# Patient Record
Sex: MALE | Race: WHITE | NOT HISPANIC OR LATINO | Employment: OTHER | URBAN - NONMETROPOLITAN AREA
[De-identification: names, ages, dates, MRNs, and addresses within clinical notes are randomized per-mention and may not be internally consistent; named-entity substitution may affect disease eponyms.]

---

## 2023-05-03 ENCOUNTER — OFFICE VISIT (OUTPATIENT)
Dept: UROLOGY | Facility: CLINIC | Age: 68
End: 2023-05-03

## 2023-05-03 VITALS — WEIGHT: 178 LBS | HEIGHT: 71 IN | BODY MASS INDEX: 24.92 KG/M2 | TEMPERATURE: 97.9 F

## 2023-05-03 DIAGNOSIS — N40.1 BENIGN PROSTATIC HYPERPLASIA WITH LOWER URINARY TRACT SYMPTOMS, SYMPTOM DETAILS UNSPECIFIED: Primary | ICD-10-CM

## 2023-05-03 RX ORDER — TAMSULOSIN HYDROCHLORIDE 0.4 MG/1
1 CAPSULE ORAL DAILY
COMMUNITY
Start: 2023-05-01

## 2023-05-03 RX ORDER — MAGNESIUM OXIDE 400 MG/1
400 TABLET ORAL DAILY
COMMUNITY

## 2023-05-03 RX ORDER — ZOLPIDEM TARTRATE 10 MG/1
5 TABLET ORAL
COMMUNITY

## 2023-05-03 RX ORDER — ROSUVASTATIN CALCIUM 10 MG/1
TABLET, COATED ORAL
COMMUNITY

## 2023-05-03 RX ORDER — LOLIUM PERENNE 100000 [BAU]/ML
SOLUTION INTRADERMAL; PERCUTANEOUS; SUBCUTANEOUS
COMMUNITY

## 2023-05-03 RX ORDER — TADALAFIL 5 MG/1
5 TABLET ORAL DAILY PRN
Qty: 90 TABLET | Refills: 3 | Status: SHIPPED | OUTPATIENT
Start: 2023-05-03

## 2023-05-03 RX ORDER — VITAMIN E 268 MG
400 CAPSULE ORAL DAILY
COMMUNITY

## 2023-05-03 RX ORDER — DIAZEPAM 10 MG/1
10 TABLET ORAL
COMMUNITY

## 2023-05-03 RX ORDER — DOXYCYCLINE HYCLATE 100 MG/1
100 CAPSULE ORAL
COMMUNITY

## 2023-05-03 RX ORDER — FAMOTIDINE 40 MG/1
40 TABLET, FILM COATED ORAL
COMMUNITY

## 2023-05-03 RX ORDER — NAPROXEN SODIUM 220 MG
220 TABLET ORAL
COMMUNITY

## 2023-05-03 RX ORDER — LEVOTHYROXINE SODIUM 0.05 MG/1
50 TABLET ORAL
COMMUNITY

## 2023-05-03 RX ORDER — PITAVASTATIN CALCIUM 4.18 MG/1
4 TABLET, FILM COATED ORAL
COMMUNITY

## 2023-05-03 RX ORDER — FINASTERIDE 5 MG/1
5 TABLET, FILM COATED ORAL DAILY
COMMUNITY

## 2023-05-03 RX ORDER — ASPIRIN 81 MG/1
81 TABLET ORAL DAILY
COMMUNITY

## 2023-05-03 RX ORDER — CYCLOBENZAPRINE HCL 10 MG
10 TABLET ORAL
COMMUNITY

## 2023-05-03 NOTE — PATIENT INSTRUCTIONS
Please get your PSA done in 6 months  I will see you back in 1 year  Decrease your Flomax to once daily and discontinue it if you are satisfied with your urination    Call if questions or concerns

## 2023-05-03 NOTE — PROGRESS NOTES
UROLOGY PROGRESS NOTE         NAME: Abdon Dodge  AGE: 79 y o  SEX: male  : 1955   MRN: [id number]    DATE: 5/3/2023  TIME: 3:16 PM    Assessment and Plan      Impression:   1  Benign prostatic hyperplasia with lower urinary tract symptoms, symptom details unspecified       Plan: Patient doing much better following Rezum procedure  It took a while to notice improvement and he had inflammatory issues requiring a short course of steroids during recovery after the procedure which was done back in December  His PSA was up slightly from what it was last year but he did discontinue his finasteride in December after the Rezum procedure  I suspect the PSA rises related to him discontinuing the finasteride  There were no abnormalities on prostate exam today  We will repeat a PSA in 6 months he is going to wean and discontinue his Flomax  We will see him back in 1 year barring any issues  Chief Complaint     Chief Complaint   Patient presents with    pan danielson-leonel Berry at [facility] Benign Prostatic Hypertrophy     History of Present Illness     HPI: Abdon Dodge is a 79y o  year old male who presents with history of BPH  He had a Rezum procedure back in December  He had some postop inflammatory issues which required a short course of steroid  His finasteride was discontinued in December  His PSA did drift up slightly since then on a recent check  Overall he is voiding much better with a good stream   His postvoid residual was less than 30 cc 2 days ago                The following portions of the patient's history were reviewed and updated as appropriate: allergies, current medications, past family history, past medical history, past social history, past surgical history and problem list   Past Medical History:   Diagnosis Date    Benign prostatic hyperplasia      Past Surgical History:   Procedure Laterality Date    BOWEL RESECTION      NECK SURGERY      fusions     shoulder  Review of Systems "    Const: Denies chills, fever and weight loss  CV: Denies chest pain  Resp: Denies SOB  GI: Denies abdominal pain, nausea and vomiting  : Denies symptoms other than stated above  Musculo: Denies back pain  Objective   BP (P) 140/70 (BP Location: Left arm, Patient Position: Sitting, Cuff Size: Adult)   Pulse (P) 78   Temp 97 9 °F (36 6 °C) (Temporal)   Ht 5' 11\" (1 803 m)   Wt 80 7 kg (178 lb)   SpO2 (P) 96%   BMI 24 83 kg/m²     Physical Exam  Const: Appears healthy and well developed  No signs of acute distress present  Resp: Respirations are regular and unlabored  CV: Rate is regular  Rhythm is regular  Abdomen: Abdomen is soft, nontender, and nondistended  Kidneys are not palpable  : The prostate is 2-3+ smooth soft and there is a central divot from the apex to the base consistent with reabsorption of prostate tissue from his procedure  Psych: Patient's attitude is cooperative   Mood is normal  Affect is normal     Procedure   Procedures     Current Medications     Current Outpatient Medications:     diazepam (VALIUM) 10 mg tablet, Take 10 mg by mouth, Disp: , Rfl:     famotidine (PEPCID) 40 MG tablet, Take 40 mg by mouth, Disp: , Rfl:     LACTOBACILLUS PO, Take 2 tablets by mouth daily, Disp: , Rfl:     levothyroxine 50 mcg tablet, Take 50 mcg by mouth, Disp: , Rfl:     magnesium oxide (MAG-OX) 400 mg tablet, Take 400 mg by mouth daily, Disp: , Rfl:     Multiple Vitamins-Minerals (MULTIVITAL PO), Take by mouth, Disp: , Rfl:     Pitavastatin Calcium (Livalo) 4 MG TABS, Take 4 mg by mouth, Disp: , Rfl:     tamsulosin (FLOMAX) 0 4 mg, Take 1 capsule by mouth daily, Disp: , Rfl:     vitamin E, tocopherol, 400 units capsule, Take 400 Units by mouth daily, Disp: , Rfl:     zolpidem (AMBIEN) 10 mg tablet, Take 5 mg by mouth, Disp: , Rfl:     aspirin (ECOTRIN LOW STRENGTH) 81 mg EC tablet, Take 81 mg by mouth daily (Patient not taking: Reported on 5/3/2023), Disp: , Rfl:     " cyclobenzaprine (FLEXERIL) 10 mg tablet, Take 10 mg by mouth (Patient not taking: Reported on 5/3/2023), Disp: , Rfl:     doxycycline hyclate (VIBRAMYCIN) 100 mg capsule, Take 100 mg by mouth (Patient not taking: Reported on 5/3/2023), Disp: , Rfl:     finasteride (PROSCAR) 5 mg tablet, Take 5 mg by mouth daily (Patient not taking: Reported on 5/3/2023), Disp: , Rfl:     naproxen sodium (ALEVE) 220 MG tablet, Take 220 mg by mouth (Patient not taking: Reported on 5/3/2023), Disp: , Rfl:     Perennial [place] Grass Pollen 348510 BAU/ML SOLN, Take by mouth (Patient not taking: Reported on 5/3/2023), Disp: , Rfl:     rosuvastatin (CRESTOR) 10 MG tablet, rosuvastatin 10 mg tablet  Take 1 tablet every day by oral route at bedtime   (Patient not taking: Reported on 5/3/2023), Disp: , Rfl:     tapentadol (NUCYNTA) 75 mg tablet, Take 75 mg by mouth 2 (two) times a day as needed (Patient not taking: Reported on 5/3/2023), Disp: , Rfl:         Fang Russell MD

## 2023-05-04 LAB
SL AMB  POCT GLUCOSE, UA: ABNORMAL
SL AMB LEUKOCYTE ESTERASE,UA: ABNORMAL
SL AMB POCT BILIRUBIN,UA: ABNORMAL
SL AMB POCT BLOOD,UA: ABNORMAL
SL AMB POCT CLARITY,UA: CLEAR
SL AMB POCT COLOR,UA: YELLOW
SL AMB POCT KETONES,UA: ABNORMAL
SL AMB POCT NITRITE,UA: ABNORMAL
SL AMB POCT PH,UA: 5.5
SL AMB POCT SPECIFIC GRAVITY,UA: 1.03
SL AMB POCT URINE PROTEIN: ABNORMAL
SL AMB POCT UROBILINOGEN: 0.2

## 2023-10-16 ENCOUNTER — TELEPHONE (OUTPATIENT)
Dept: UROLOGY | Facility: CLINIC | Age: 68
End: 2023-10-16

## 2023-10-16 NOTE — TELEPHONE ENCOUNTER
Dr. Nancy Harris reviewed PSA from 10/12/23, no further reccs. Continue follow up appt.  PT aware psa normal.

## 2024-05-02 PROBLEM — N40.1 BENIGN PROSTATIC HYPERPLASIA WITH LOWER URINARY TRACT SYMPTOMS: Status: ACTIVE | Noted: 2024-05-02

## 2024-05-03 ENCOUNTER — OFFICE VISIT (OUTPATIENT)
Dept: UROLOGY | Facility: CLINIC | Age: 69
End: 2024-05-03
Payer: COMMERCIAL

## 2024-05-03 VITALS
DIASTOLIC BLOOD PRESSURE: 68 MMHG | RESPIRATION RATE: 16 BRPM | OXYGEN SATURATION: 97 % | HEIGHT: 71 IN | HEART RATE: 63 BPM | WEIGHT: 177.6 LBS | BODY MASS INDEX: 24.86 KG/M2 | SYSTOLIC BLOOD PRESSURE: 122 MMHG | TEMPERATURE: 97.9 F

## 2024-05-03 DIAGNOSIS — N40.1 BENIGN PROSTATIC HYPERPLASIA WITH LOWER URINARY TRACT SYMPTOMS, SYMPTOM DETAILS UNSPECIFIED: Primary | ICD-10-CM

## 2024-05-03 LAB
SL AMB  POCT GLUCOSE, UA: NORMAL
SL AMB LEUKOCYTE ESTERASE,UA: NORMAL
SL AMB POCT BILIRUBIN,UA: NORMAL
SL AMB POCT BLOOD,UA: NORMAL
SL AMB POCT CLARITY,UA: CLEAR
SL AMB POCT COLOR,UA: YELLOW
SL AMB POCT KETONES,UA: NORMAL
SL AMB POCT NITRITE,UA: NORMAL
SL AMB POCT PH,UA: 6
SL AMB POCT SPECIFIC GRAVITY,UA: 1.01
SL AMB POCT URINE PROTEIN: NORMAL
SL AMB POCT UROBILINOGEN: 0.2

## 2024-05-03 PROCEDURE — 81003 URINALYSIS AUTO W/O SCOPE: CPT | Performed by: UROLOGY

## 2024-05-03 PROCEDURE — 99213 OFFICE O/P EST LOW 20 MIN: CPT | Performed by: UROLOGY

## 2024-05-03 RX ORDER — HYDROCODONE BITARTRATE AND ACETAMINOPHEN 5; 325 MG/1; MG/1
1 TABLET ORAL EVERY 6 HOURS PRN
COMMUNITY

## 2024-05-03 NOTE — PROGRESS NOTES
UROLOGY PROGRESS NOTE         NAME: Keaton Baldwin  AGE: 68 y.o. SEX: male  : 1955   MRN: [id number]    DATE: 5/3/2024  TIME: 10:25 AM    Assessment and Plan      Impression:   1. Benign prostatic hyperplasia with lower urinary tract symptoms, symptom details unspecified  -     POCT urine dip auto non-scope         Plan: Patient doing well urologically.  Exam good today.  Urinalysis normal.  No significant voiding issues up once a night.  Continues on tadalafil and finasteride.  PSA normal.  Follow-up 1 year.      Chief Complaint     Chief Complaint   Patient presents with   • Follow-up     Yearly check up. Patient had last PSA done 10/12/23 it was 1.21     History of Present Illness     HPI: Keaton Baldwin is a 68 y.o. year old male who presents with BPH.  Patient on tamsulosin finasteride and tadalafil.  Here for follow-up.  Last PSA 1.21.  2023.  Patient is doing very well.  He is active he exercises keeps his weight under control.  He does note some diminished libido which could potentially be related to his medication.  I offered to check testosterone levels.  He has a significant family history of heart disease and cardiovascular issues.  He is not really interested in supplemental testosterone.  Concerned about risks.  He does take magnesium vitamin B and vitamin D supplements.  No sleep apnea.              The following portions of the patient's history were reviewed and updated as appropriate: allergies, current medications, past family history, past medical history, past social history, past surgical history and problem list.  Past Medical History:   Diagnosis Date   • Benign prostatic hyperplasia      Past Surgical History:   Procedure Laterality Date   • BOWEL RESECTION     • NECK SURGERY      fusions     shoulder  Review of Systems     Const: Denies chills, fever and weight loss.  CV: Denies chest pain.  Resp: Denies SOB.  GI: Denies abdominal pain, nausea and vomiting.  : Denies symptoms  "other than stated above.  Musculo: Denies back pain.    Objective   /68   Pulse 63   Temp 97.9 °F (36.6 °C) (Tympanic)   Resp 16   Ht 5' 11\" (1.803 m)   Wt 80.6 kg (177 lb 9.6 oz)   SpO2 97%   BMI 24.77 kg/m²     Physical Exam  Const: Appears healthy and well developed. No signs of acute distress present.  Resp: Respirations are regular and unlabored.   CV: Rate is regular. Rhythm is regular.  Abdomen: Abdomen is soft, nontender, and nondistended. Kidneys are not palpable.  : Prostate is 2-3+ smooth soft symmetric elastic no nodules.  Psych: Patient's attitude is cooperative. Mood is normal. Affect is normal.    Procedure   Procedures     Current Medications     Current Outpatient Medications:   •  aspirin (ECOTRIN LOW STRENGTH) 81 mg EC tablet, Take 81 mg by mouth daily, Disp: , Rfl:   •  diazepam (VALIUM) 10 mg tablet, Take 10 mg by mouth, Disp: , Rfl:   •  famotidine (PEPCID) 40 MG tablet, Take 40 mg by mouth, Disp: , Rfl:   •  HYDROcodone-acetaminophen (NORCO) 5-325 mg per tablet, Take 1 tablet by mouth every 6 (six) hours as needed for pain, Disp: , Rfl:   •  LACTOBACILLUS PO, Take 2 tablets by mouth daily, Disp: , Rfl:   •  levothyroxine 50 mcg tablet, Take 50 mcg by mouth, Disp: , Rfl:   •  magnesium oxide (MAG-OX) 400 mg tablet, Take 400 mg by mouth daily, Disp: , Rfl:   •  Multiple Vitamins-Minerals (MULTIVITAL PO), Take by mouth, Disp: , Rfl:   •  naproxen sodium (ALEVE) 220 MG tablet, Take 220 mg by mouth every 12 (twelve) hours as needed, Disp: , Rfl:   •  Pitavastatin Calcium (Livalo) 4 MG TABS, Take 4 mg by mouth, Disp: , Rfl:   •  tadalafil (CIALIS) 5 MG tablet, Take 1 tablet (5 mg total) by mouth daily as needed for erectile dysfunction, Disp: 90 tablet, Rfl: 3  •  vitamin E, tocopherol, 400 units capsule, Take 400 Units by mouth daily, Disp: , Rfl:   •  zolpidem (AMBIEN) 10 mg tablet, Take 5 mg by mouth daily at bedtime as needed, Disp: , Rfl:   •  cyclobenzaprine (FLEXERIL) 10 mg " tablet, Take 10 mg by mouth (Patient not taking: Reported on 5/3/2023), Disp: , Rfl:   •  finasteride (PROSCAR) 5 mg tablet, Take 5 mg by mouth daily (Patient not taking: Reported on 5/3/2023), Disp: , Rfl:         Lida Berry MD

## 2024-05-17 DIAGNOSIS — N40.1 BENIGN PROSTATIC HYPERPLASIA WITH LOWER URINARY TRACT SYMPTOMS, SYMPTOM DETAILS UNSPECIFIED: ICD-10-CM

## 2024-05-17 RX ORDER — TADALAFIL 5 MG/1
5 TABLET ORAL DAILY PRN
Qty: 90 TABLET | Refills: 1 | Status: SHIPPED | OUTPATIENT
Start: 2024-05-17

## 2025-05-28 ENCOUNTER — TELEPHONE (OUTPATIENT)
Age: 70
End: 2025-05-28

## 2025-05-28 NOTE — TELEPHONE ENCOUNTER
Pt under care of:  Dr. Berry  Office Location: [facility]    Insurance   Current Insurance?Medicare A and B    Insurance E-verified?  Needs Review      History  Last Seen (include Follow Up recommendations of last visit- see Office Visit - Instructions):     5/3/24   Return in about 1 year (around 5/3/2025).    Pt calling due to: Schedule 1 yr f/u    Active Symptoms?  Explain: N/a    Appointment Details   Date:  7/9/25    Time:  9:15 am     Location:  [facility]    Provider:  Kristi    Does the appointment need further review? Y      Pt can be reached at: [phone]

## 2025-07-06 NOTE — PROGRESS NOTES
UROLOGY PROGRESS NOTE         NAME: Keaton Baldwin  AGE: 69 y.o. SEX: male  : 1955   MRN: [id number]    DATE: 2025  TIME: 9:33 AM    Assessment and Plan      Impression:   1. Benign prostatic hyperplasia with lower urinary tract symptoms, symptom details unspecified  -     POCT urine dip auto non-scope  -     Testosterone, Free, Bioavailable and Total, Males (Adult), Immunoassay; Future; Expected date: Collect anytime       Plan: Patient is doing well regarding his voiding.  He is on no BPH medication.  He does have a low libido.  I requested a free and total testosterone which he can do with his PCP.  He should continue to have an annual PSA and prostate exam.  Urinalysis today is normal.  He is happy with his voiding.  He is having considerable difficulty with his left and right shoulder.  May need a shoulder replacement at some point.  Is seeing an orthopedist at [place].      Chief Complaint     Chief Complaint   Patient presents with   • Follow-up     No issues or concerns     History of Present Illness     HPI: Keaton Baldwin is a 69 y.o. year old male who presents with BPH.  Doing well on tadalafil and finasteride.  Recent PSA satisfactory at 1.21.  Annual follow-up.              The following portions of the patient's history were reviewed and updated as appropriate: allergies, current medications, past family history, past medical history, past social history, past surgical history and problem list.  Past Medical History[1]  Past Surgical History[2]  shoulder  Review of Systems     Const: Denies chills, fever and weight loss.  CV: Denies chest pain.  Resp: Denies SOB.  GI: Denies abdominal pain, nausea and vomiting.  : Denies symptoms other than stated above.  Musculo: Denies back pain.    Objective   /82   Pulse 63   Temp 98.3 °F (36.8 °C)   Wt 78.2 kg (172 lb 6.4 oz)   SpO2 97%   BMI 24.04 kg/m²     Physical Exam  Const: Appears healthy and well developed. No signs of acute distress  present.  Resp: Respirations are regular and unlabored.   CV: Rate is regular. Rhythm is regular.  Abdomen: Abdomen is soft, nontender, and nondistended. Kidneys are not palpable.  : Prostate is 2-3+ smooth soft no nodules no hard areas flattened.  Psych: Patient's attitude is cooperative. Mood is normal. Affect is normal.    Procedure   Procedures     Current Medications   Current Medications[3]        Lida Berry MD               [1]  Past Medical History:  Diagnosis Date   • Benign prostatic hyperplasia    • Rotator cuff tear    [2]  Past Surgical History:  Procedure Laterality Date   • BOWEL RESECTION     • NECK SURGERY      fusions   [3]    Current Outpatient Medications:   •  diazepam (VALIUM) 10 mg tablet, Take 10 mg by mouth, Disp: , Rfl:   •  HYDROcodone-acetaminophen (NORCO) 5-325 mg per tablet, Take 1 tablet by mouth every 6 (six) hours as needed for pain, Disp: , Rfl:   •  LACTOBACILLUS PO, Take 2 tablets by mouth in the morning., Disp: , Rfl:   •  levothyroxine 50 mcg tablet, Take 50 mcg by mouth, Disp: , Rfl:   •  magnesium oxide (MAG-OX) 400 mg tablet, Take 400 mg by mouth in the morning., Disp: , Rfl:   •  meloxicam (MOBIC) 15 mg tablet, , Disp: , Rfl:   •  Multiple Vitamins-Minerals (MULTIVITAL PO), Take by mouth, Disp: , Rfl:   •  tadalafil (CIALIS) 5 MG tablet, TAKE 1 TABLET BY MOUTH DAILY AS NEEDED, Disp: 90 tablet, Rfl: 1  •  vitamin E, tocopherol, 400 units capsule, Take 400 Units by mouth in the morning., Disp: , Rfl:   •  aspirin (ECOTRIN LOW STRENGTH) 81 mg EC tablet, Take 81 mg by mouth daily (Patient not taking: Reported on 7/7/2025), Disp: , Rfl:   •  cyclobenzaprine (FLEXERIL) 10 mg tablet, Take 10 mg by mouth (Patient not taking: Reported on 5/3/2023), Disp: , Rfl:   •  famotidine (PEPCID) 40 MG tablet, Take 40 mg by mouth (Patient not taking: Reported on 7/7/2025), Disp: , Rfl:   •  finasteride (PROSCAR) 5 mg tablet, Take 5 mg by mouth daily (Patient not taking: Reported on  5/3/2023), Disp: , Rfl:   •  naproxen sodium (ALEVE) 220 MG tablet, Take 220 mg by mouth every 12 (twelve) hours as needed (Patient not taking: Reported on 7/7/2025), Disp: , Rfl:   •  Pitavastatin Calcium (Livalo) 4 MG TABS, Take 4 mg by mouth (Patient not taking: Reported on 7/7/2025), Disp: , Rfl:   •  zolpidem (AMBIEN) 10 mg tablet, Take 5 mg by mouth daily at bedtime as needed (Patient not taking: Reported on 7/7/2025), Disp: , Rfl:

## 2025-07-07 ENCOUNTER — OFFICE VISIT (OUTPATIENT)
Dept: UROLOGY | Facility: CLINIC | Age: 70
End: 2025-07-07
Payer: MEDICARE

## 2025-07-07 VITALS
WEIGHT: 172.4 LBS | TEMPERATURE: 98.3 F | SYSTOLIC BLOOD PRESSURE: 136 MMHG | DIASTOLIC BLOOD PRESSURE: 82 MMHG | OXYGEN SATURATION: 97 % | BODY MASS INDEX: 24.04 KG/M2 | HEART RATE: 63 BPM

## 2025-07-07 DIAGNOSIS — N40.1 BENIGN PROSTATIC HYPERPLASIA WITH LOWER URINARY TRACT SYMPTOMS, SYMPTOM DETAILS UNSPECIFIED: Primary | ICD-10-CM

## 2025-07-07 LAB
SL AMB  POCT GLUCOSE, UA: NORMAL
SL AMB LEUKOCYTE ESTERASE,UA: NORMAL
SL AMB POCT BILIRUBIN,UA: NORMAL
SL AMB POCT BLOOD,UA: NORMAL
SL AMB POCT CLARITY,UA: CLEAR
SL AMB POCT COLOR,UA: NORMAL
SL AMB POCT KETONES,UA: NORMAL
SL AMB POCT NITRITE,UA: NORMAL
SL AMB POCT PH,UA: 5.5
SL AMB POCT SPECIFIC GRAVITY,UA: 1.02
SL AMB POCT URINE PROTEIN: NORMAL
SL AMB POCT UROBILINOGEN: 0.2

## 2025-07-07 PROCEDURE — 81003 URINALYSIS AUTO W/O SCOPE: CPT | Performed by: UROLOGY

## 2025-07-07 PROCEDURE — 99213 OFFICE O/P EST LOW 20 MIN: CPT | Performed by: UROLOGY

## 2025-07-07 RX ORDER — MELOXICAM 15 MG/1
TABLET ORAL
COMMUNITY
Start: 2025-07-03